# Patient Record
Sex: MALE | Race: OTHER | Employment: FULL TIME | URBAN - METROPOLITAN AREA
[De-identification: names, ages, dates, MRNs, and addresses within clinical notes are randomized per-mention and may not be internally consistent; named-entity substitution may affect disease eponyms.]

---

## 2019-06-17 ENCOUNTER — HOSPITAL ENCOUNTER (EMERGENCY)
Age: 34
Discharge: HOME | End: 2019-06-17
Attending: EMERGENCY MEDICINE
Payer: COMMERCIAL

## 2019-06-17 VITALS
RESPIRATION RATE: 18 BRPM | SYSTOLIC BLOOD PRESSURE: 103 MMHG | WEIGHT: 204 LBS | BODY MASS INDEX: 30.21 KG/M2 | DIASTOLIC BLOOD PRESSURE: 90 MMHG | TEMPERATURE: 98.3 F | HEIGHT: 69 IN | OXYGEN SATURATION: 96 % | HEART RATE: 74 BPM

## 2019-06-17 DIAGNOSIS — S61.411A LACERATION OF RIGHT HAND WITHOUT FOREIGN BODY, INITIAL ENCOUNTER: Primary | ICD-10-CM

## 2019-06-17 PROCEDURE — 75810000293 HC SIMP/SUPERF WND  RPR

## 2019-06-17 PROCEDURE — 90715 TDAP VACCINE 7 YRS/> IM: CPT | Performed by: PHYSICIAN ASSISTANT

## 2019-06-17 PROCEDURE — 77030018836 HC SOL IRR NACL ICUM -A

## 2019-06-17 PROCEDURE — 90471 IMMUNIZATION ADMIN: CPT

## 2019-06-17 PROCEDURE — 74011000250 HC RX REV CODE- 250: Performed by: PHYSICIAN ASSISTANT

## 2019-06-17 PROCEDURE — 99281 EMR DPT VST MAYX REQ PHY/QHP: CPT

## 2019-06-17 PROCEDURE — 74011250636 HC RX REV CODE- 250/636: Performed by: PHYSICIAN ASSISTANT

## 2019-06-17 PROCEDURE — 77030031132 HC SUT NYL COVD -A

## 2019-06-17 RX ORDER — TRAMADOL HYDROCHLORIDE 50 MG/1
50 TABLET ORAL
Qty: 10 TAB | Refills: 0 | Status: SHIPPED | OUTPATIENT
Start: 2019-06-17 | End: 2019-06-20

## 2019-06-17 RX ORDER — LIDOCAINE HYDROCHLORIDE AND EPINEPHRINE 10; 10 MG/ML; UG/ML
1.5 INJECTION, SOLUTION INFILTRATION; PERINEURAL
Status: COMPLETED | OUTPATIENT
Start: 2019-06-17 | End: 2019-06-17

## 2019-06-17 RX ORDER — CEPHALEXIN 500 MG/1
500 CAPSULE ORAL 3 TIMES DAILY
Qty: 21 CAP | Refills: 0 | Status: SHIPPED | OUTPATIENT
Start: 2019-06-17 | End: 2019-06-24

## 2019-06-17 RX ADMIN — TETANUS TOXOID, REDUCED DIPHTHERIA TOXOID AND ACELLULAR PERTUSSIS VACCINE, ADSORBED 0.5 ML: 5; 2.5; 8; 8; 2.5 SUSPENSION INTRAMUSCULAR at 20:45

## 2019-06-17 RX ADMIN — LIDOCAINE HYDROCHLORIDE,EPINEPHRINE BITARTRATE 15 MG: 10; .01 INJECTION, SOLUTION INFILTRATION; PERINEURAL at 20:07

## 2019-06-17 NOTE — ED TRIAGE NOTES
Triage: Patient was cutting an avocado with a kitchen knife and it slipped.  + laceration in between the right index and middle finger. Tetanus is not up to date.   Was sent here from [facility].

## 2019-06-18 NOTE — DISCHARGE INSTRUCTIONS
Patient Education        Garcia en la mano cerrados con puntos: Instrucciones de cuidado - [ Cuts on the Hand Closed With Stitches: Care Instructions ]  Instrucciones de cuidado    Un shahram en la mano puede ser en los dedos, el pulgar o la loya o el dorso de la [place]. A veces, un shahram puede lesionar los tendones, los vasos sanguíneos o los nervios de la [place]. El médico utilizó puntos para cerrar el shahram. Usar puntos también ayuda a que sane el shahram y reduce la formación de cicatrices. Es posible que el médico le haya dado kelle tablilla (férula) para ayudar a evitar que [employer] mano, los dedos o el pulgar. Si el shahram fue profundo y a través de la piel, el médico [employer] capas de puntos. En la capa más profunda, se juntan las partes profundas del shahram. Esos puntos se disolverán, y no es necesario quitarlos. Los puntos de la capa superior son los que ve en el shahram. Es probable que le pongan kelle venda. Será necesario que [facility] puntos, por lo general al cabo de 7 a 14 charissa. El médico puede sugerirle earl a un especialista de la mano si el shahram es muy profundo, si tiene problemas para  los dedos o si tiene menos sensibilidad en la mano. El médico lo christy revisado cuidadosamente, willian pueden aparecer [employer] tarde. Si observa algún problema o un síntoma nuevo, obtenga tratamiento médico de inmediato. La atención de seguimiento es kelle parte clave de soler tratamiento y seguridad. Asegúrese de hacer y acudir a todas las citas, y llame a soler médico si está teniendo problemas. También es kelle buena idea saber los resultados de deric exámenes y mantener kelle lista de los medicamentos que renee. ¿Cómo puede cuidarse en el hogar? · [address] primeras 24 a 48 horas. Después de esto, puede ducharse si soler médico lo aprueba. Seque el shahram con toques suaves de toalla. · No sumerja el shahram, ezra, por ejemplo, en kelle bañera. Soler médico le dirá cuándo es seguro mojar el shahram.   · Si soler médico le dijo cómo cuidarse el shahram, siga las instrucciones de au médico. Si no le jennifer instrucciones, siga estos consejos generales:  ? Después de las primeras 24 a 48 horas, lávese la neeraj alrededor del shahram con agua limpia 2 veces al día. No use peróxido de hidrógeno (agua [place]) ni alcohol, los cuales pueden retrasar la sanación. ? Puede cubrir el shahram con kelle capa delgada de vaselina y [facility] venda no adherente. ? Aplíquese más vaselina y [facility] la venda según sea necesario. · Mantenga elevada la mano adolorida sobre kelle almohada en cualquier momento que se siente o se acueste clarissa los 3 días siguientes. Trate de mantenerla por encima del nivel del corazón. [place] ayudará a reducir la hinchazón. · Evite cualquier actividad que podría hacer que el shahram se vuelva a abrir. · No se quite los puntos usted mismo. Au médico le dirá cuándo regresar para [employer]. · Sea primitivo con los medicamentos. [place] los analgésicos (medicamentos para el dolor) exactamente según lo indicado. ? Si el médico le recetó un analgésico, tómelo según las indicaciones. ? Si no está tomando un analgésico recetado, pregúntele a au médico si puede dale juan f de [employer]. ¿Cuándo debe pedir ayuda? Llame a au médico ahora mismo o busque atención médica inmediata si:    · Siente un dolor nuevo o el dolor empeora.     · La piel cercana al shahram está fría o pálida, o cambia de color.     · Siente hormigueo, debilitamiento o entumecimiento cerca del shahram.     · El shahram comienza a sangrar, y la clark empapa la venda. Es normal que salgan pequeñas cantidades de [place].     · Tiene dificultades para  la neeraj de la mano cercana al shahram.     · Tiene síntomas de infección, tales ezra:  ? Aumento del dolor, la hinchazón, el enrojecimiento o la temperatura alrededor del shahram. ? Vetas rojizas que salen del shahram. ? Pus que sale del shahram.   ? Vincent Lennox especial atención a los cambios en au maria isabel y asegúrese de comunicarse con au médico si:    · No mejora ezra se esperaba. ¿Dónde puede encontrar más información en inglés? Eliu Arshad a http://carol-yuri.info/. Escriba T250 en la búsqueda para aprender más acerca de \"Garcia en la mano cerrados con puntos: Instrucciones de cuidado - [ Cuts on the Hand Closed With Stitches: Care Instructions ]. \"  Revisado: 23 septiembre, 2018  Versión del contenido: 11.9  © 7058-5600 Healthwise, Incorporated. Las instrucciones de cuidado fueron adaptadas bajo licencia por [employer] (which disclaims liability or warranty for this information). Si usted tiene [place] afección médica o sobre estas instrucciones, siempre pregunte a au profesional de maria isabel. [employer] niega toda garantía o responsabilidad por au uso de esta información.

## 2019-06-18 NOTE — ED PROVIDER NOTES
35 y.o. male with no significant past medical history presents ambulatory and accompanied by relative with chief complaint of a laceration between his right 2nd and 3rd fingers that occurred just PTA. Pt's family explains that the pt was cutting an avocado when he accidentally cut himself. Per relative, the pt's tetanus vaccination is not UTD. Pt denies any fevers, chills, nausea, vomiting, or any other symptoms at this time. There are no other acute medical concerns at this time. Social hx: Patient denies Tobacco use. Denies EtOH use. Denies illicit drug abuse. PCP: No primary care provider on file. Note written by Jeovanny Olivares, as dictated by Td Horowitz PA-C, 8:07 PM          The history is provided by the patient and a relative. No  was used. History reviewed. No pertinent past medical history. History reviewed. No pertinent surgical history. History reviewed. No pertinent family history.     Social History     Socioeconomic History    Marital status: SINGLE     Spouse name: Not on file    Number of children: Not on file    Years of education: Not on file    Highest education level: Not on file   Occupational History    Not on file   Social Needs    Financial resource strain: Not on file    Food insecurity:     Worry: Not on file     Inability: Not on file    Transportation needs:     Medical: Not on file     Non-medical: Not on file   Tobacco Use    Smoking status: Never Smoker    Smokeless tobacco: Never Used   Substance and Sexual Activity    Alcohol use: Never     Frequency: Never    Drug use: Never    Sexual activity: Not on file   Lifestyle    Physical activity:     Days per week: Not on file     Minutes per session: Not on file    Stress: Not on file   Relationships    Social connections:     Talks on phone: Not on file     Gets together: Not on file     Attends Sabianist service: Not on file     Active member of club or organization: Not on file     Attends meetings of clubs or organizations: Not on file     Relationship status: Not on file    Intimate partner violence:     Fear of current or ex partner: Not on file     Emotionally abused: Not on file     Physically abused: Not on file     Forced sexual activity: Not on file   Other Topics Concern    Not on file   Social History Narrative    Not on file         ALLERGIES: Patient has no known allergies. Review of Systems   Constitutional: Negative for chills and fever. Gastrointestinal: Negative for nausea and vomiting. Musculoskeletal: Negative for arthralgias. Skin: Positive for wound (laceration between right 2nd and 3rd fingers). Neurological: Negative for numbness. All other systems reviewed and are negative. Vitals:    06/17/19 1951   BP: 103/90   Pulse: 74   Resp: 18   Temp: 98.3 °F (36.8 °C)   SpO2: 96%   Weight: 92.5 kg (204 lb)   Height: 5' 9\" (1.753 m)            Physical Exam   Constitutional: He is oriented to person, place, and time. He appears well-developed and well-nourished. HENT:   Head: Normocephalic and atraumatic. Right Ear: External ear normal.   Left Ear: External ear normal.   Mouth/Throat: Oropharynx is clear and moist. No oropharyngeal exudate. Eyes: Pupils are equal, round, and reactive to light. Conjunctivae and EOM are normal. Right eye exhibits no discharge. Left eye exhibits no discharge. No scleral icterus. Neck: Normal range of motion. Neck supple. No tracheal deviation present. No thyromegaly present. Cardiovascular: Normal rate, regular rhythm, normal heart sounds and intact distal pulses. No murmur heard. Pulmonary/Chest: Effort normal and breath sounds normal. No respiratory distress. He has no wheezes. He has no rales. Abdominal: Soft. Bowel sounds are normal. He exhibits no distension. There is no tenderness. There is no rebound and no guarding. Musculoskeletal: Normal range of motion. He exhibits no edema or tenderness. Hands:  Lymphadenopathy:     He has no cervical adenopathy. Neurological: He is alert and oriented to person, place, and time. No cranial nerve deficit. Coordination normal.   Skin: Skin is warm. No rash noted. No erythema. Psychiatric: He has a normal mood and affect. His behavior is normal. Judgment and thought content normal.   Nursing note and vitals reviewed. MDM  Number of Diagnoses or Management Options  Laceration of right hand without foreign body, initial encounter:   Diagnosis management comments: Assesment/Plan- 35 y.o. Patient presents with:  Laceration  differential includes: laceration, . Wound is well approximated with sutures. Patient tolerated well. Recommend PCP, ortho follow up. Patient educated on reasons to return to the ED. Wound Repair  Date/Time: 6/17/2019 8:18 PM  [facility]: Edna  Preparation: skin prepped with Shur-Clens  Pre-procedure re-eval: Immediately prior to the procedure, the patient was reevaluated and found suitable for the planned procedure and any planned medications. Time out: Immediately prior to the procedure a time out was called to verify the correct patient, procedure, equipment, staff and marking as appropriate. .  Location details: right hand  Wound length:2.5 cm or less  Anesthesia: local infiltration    Anesthesia:  Local Anesthetic: lidocaine 1% with epinephrine  Anesthetic total: 5 mL  Foreign bodies: no foreign bodies  Irrigation solution: saline  Irrigation method: syringe  Debridement: none  Fascia closure: 4-0 nylon  Number of sutures: 7  Technique: simple  Approximation: close  Patient tolerance: Patient tolerated the procedure well with no immediate complications  My total time at bedside, performing this procedure was 16-30 minutes.

## 2019-06-24 ENCOUNTER — HOSPITAL ENCOUNTER (EMERGENCY)
Age: 34
Discharge: HOME | End: 2019-06-24
Attending: EMERGENCY MEDICINE
Payer: COMMERCIAL

## 2019-06-24 VITALS
BODY MASS INDEX: 30.21 KG/M2 | OXYGEN SATURATION: 98 % | RESPIRATION RATE: 16 BRPM | WEIGHT: 204 LBS | DIASTOLIC BLOOD PRESSURE: 57 MMHG | HEIGHT: 69 IN | SYSTOLIC BLOOD PRESSURE: 101 MMHG | HEART RATE: 59 BPM | TEMPERATURE: 98.7 F

## 2019-06-24 DIAGNOSIS — Z48.02 VISIT FOR SUTURE REMOVAL: Primary | ICD-10-CM

## 2019-06-24 PROCEDURE — 75810000275 HC EMERGENCY DEPT VISIT NO LEVEL OF CARE

## 2019-06-24 NOTE — ED PROVIDER NOTES
35 y.o. male with no significant past medical history who presents from home with chief complaint of suture removal. Patient has ~6 sutures placed to his right third finger a week ago, and presents to [facility] ED today for removal. Patient endorses \"itchiness\" and mild swelling, but denies any current pain. Patient is right hand dominant. Patient's last tetanus 06/17/2019. Pt denies fever, chills, cough, congestion, shortness of breath, chest pain, abdominal pain, nausea, vomiting, diarrhea, difficulty with urination or dysuria. There are no other acute medical concerns at this time. Note written by Jeovanny Guillen, as dictated by Farhat Arnold MD 12:05 PM        The history is provided by the patient. No past medical history on file. No past surgical history on file. No family history on file.     Social History     Socioeconomic History    Marital status: SINGLE     Spouse name: Not on file    Number of children: Not on file    Years of education: Not on file    Highest education level: Not on file   Occupational History    Not on file   Social Needs    Financial resource strain: Not on file    Food insecurity:     Worry: Not on file     Inability: Not on file    Transportation needs:     Medical: Not on file     Non-medical: Not on file   Tobacco Use    Smoking status: Never Smoker    Smokeless tobacco: Never Used   Substance and Sexual Activity    Alcohol use: Never     Frequency: Never    Drug use: Never    Sexual activity: Not on file   Lifestyle    Physical activity:     Days per week: Not on file     Minutes per session: Not on file    Stress: Not on file   Relationships    Social connections:     Talks on phone: Not on file     Gets together: Not on file     Attends Rastafari service: Not on file     Active member of club or organization: Not on file     Attends meetings of clubs or organizations: Not on file     Relationship status: Not on file    Intimate partner violence:     Fear of current or ex partner: Not on file     Emotionally abused: Not on file     Physically abused: Not on file     Forced sexual activity: Not on file   Other Topics Concern    Not on file   Social History Narrative    Not on file         ALLERGIES: Patient has no known allergies. Review of Systems   Constitutional: Negative for activity change and fever. Eyes: Negative for pain. Respiratory: Negative for cough and shortness of breath. Cardiovascular: Negative for chest pain and leg swelling. Gastrointestinal: Negative for abdominal pain, diarrhea, nausea and vomiting. Genitourinary: Negative for difficulty urinating, dysuria, flank pain and hematuria. Musculoskeletal: Negative for gait problem, neck pain and neck stiffness. Skin: Negative for color change and wound. Neurological: Negative for speech difficulty and headaches. Hematological: Does not bruise/bleed easily. Psychiatric/Behavioral: Negative for confusion. All other systems reviewed and are negative. There were no vitals filed for this visit. Physical Exam   Constitutional: He is oriented to person, place, and time. He appears well-developed and well-nourished. No distress. HENT:   Head: Normocephalic and atraumatic. Right Ear: External ear normal.   Left Ear: External ear normal.   Eyes: Pupils are equal, round, and reactive to light. EOM are normal.   Neck: Normal range of motion. Neck supple. No JVD present. No tracheal deviation present. Cardiovascular: Normal rate, regular rhythm and normal heart sounds. Exam reveals no gallop and no friction rub. No murmur heard. Pulmonary/Chest: Effort normal and breath sounds normal. No stridor. No respiratory distress. He has no wheezes. He has no rales. Abdominal: Soft. Bowel sounds are normal. He exhibits no distension. There is no tenderness. There is no rebound and no guarding. Musculoskeletal: He exhibits no edema or tenderness. Good ROM of 3rd right finger. Neurological: He is alert and oriented to person, place, and time. He has normal reflexes. No cranial nerve deficit. Coordination normal.   Skin: Skin is warm and dry. No rash noted. He is not diaphoretic. Radial side of right 3rd finger 6 sutures intact, no redness, warmth, or drainage. Psychiatric: He has a normal mood and affect. His behavior is normal. Judgment and thought content normal.   Nursing note and vitals reviewed. Note written by Jeovanny Kamara, as dictated by Anna Castillo MD 12:09 PM       MDM  Number of Diagnoses or Management Options  Visit for suture removal:   Diagnosis management comments: Pt w/ sutures to be removed. I removed sutures. Suture/Staple Removal  Date/Time: 6/24/2019 12:09 PM  Performed by: Anna Castillo MD  Authorized by: Anna Castillo MD     Consent:     Consent obtained:  Verbal    Risks discussed:  Bleeding and pain  Location:     Location: 3rd right finger. Procedure details:     Wound appearance:  No signs of infection, good wound healing and clean    Number of sutures removed:  6  Post-procedure details:     Patient tolerance of procedure: Tolerated well, no immediate complications    Note written by Jeovanny Kamara, as dictated by Anna Castillo MD 12:05 PM    PROGRESS NOTE:  12:09 PM Provider removing sutures in triage. Good return precautions given to patient. Close follow up with PCP recommended. Patient and/or family voices understanding of this plan. Discharge instructions were explained by me and all concerns were addressed.

## 2019-06-24 NOTE — DISCHARGE INSTRUCTIONS
Patient Education        Blu Litten de la retirada de puntos de sutura y grapas - [ Cathern Adjutant and [place] Removal ]  ¿Cuándo se retiran los puntos de sutura y las grapas? Au médico le indicará cuándo deben [employer] puntos de sutura o las grapas, por lo general al cabo de 7 a 14 charissa. El tiempo que tenga que esperar dependerá de cosas ezra la ubicación de la herida, el tamaño y la profundidad de la herida y au estado de maria isabel general. No se quite los puntos de sutura usted mismo. Los puntos en la [place] se suelen retirar al cabo de kelle semana. [place] los puntos y las grapas en otras zonas del cuerpo, ezra en la espalda o el abdomen o sobre kelle articulación, podrían tener que permanecer en au lugar por más [place], frecuentemente kelle o [place]. Asegúrese de seguir las instrucciones de au médico.  ¿Cómo se retiran los puntos de sutura y las grapas? Generalmente no duele cuando el médico retira los puntos de sutura o las grapas. Usted puede sentir un pequeño tirón cuando se saca cada punto o grapa. · Estará sentado o recostado. · Para retirar los puntos de sutura, el médico utilizará tijeras para cortar cada juan f de los nudos y [employer] [facility] hilos. · Para retirar las grapas, el médico usará un instrumento para sacar las grapas kelle por [place]. · La neeraj puede estar sensible después de kori retirado los puntos de sutura o las grapas. [place] debería sentirse mejor al cabo de unos minutos o después de algunas horas. ¿Qué puede esperar después de que le retiren los puntos de sutura o las grapas? Dependiendo del tipo de shahram y de au ubicación, usted tendrá kelle cicatriz. Las cicatrices por lo general son menos visibles con el tiempo. Mantenga limpia la neeraj, [place] no se necesita kelle venda. ¿Cuándo debe pedir ayuda? Llame a au médico ahora mismo o busque atención médica inmediata si:  · Siente un dolor nuevo o el dolor empeora.   · Tiene dificultades para  la neeraj que rodea la cicatriz. · Tiene síntomas de infección, tales ezra:  ? Mayor dolor, hinchazón, enrojecimiento o aumento de la temperatura alrededor de la cicatriz. ? Vetas rojizas que salen de la cicatriz. ? Pus que sale de la cicatriz. ? Fiebre. Preste especial atención a los cambios en au maria isabel y asegúrese de comunicarse con au médico si:  · Se le abre la cicatriz. · No mejora ezra se esperaba. La atención de seguimiento es kelle parte clave de au tratamiento y seguridad. Asegúrese de hacer y acudir a todas las citas, y llame a au médico si está teniendo problemas. También es kelle buena idea mantener kelle lista de los medicamentos que renee. ¿Dónde puede encontrar más información en inglés? Jared Burnett a http://carol-yuri.info/. Tonya Nanette R485 en la búsqueda para aprender más acerca de \"Aprenda acerca de la retirada de puntos de sutura y grapas - [ Learning About Stitches and Staples Removal ]. \"  Revisado: 23 septiembre, 2018  Versión del contenido: 11.9  © 5294-3837 Healthwise, Incorporated. Las instrucciones de cuidado fueron adaptadas bajo licencia por [employer] (which disclaims liability or warranty for this information). Si usted tiene [place] afección médica o sobre estas instrucciones, siempre pregunte a au profesional de maria isabel. Healthwise, Incorporated niega toda garantía o responsabilidad por au uso de esta información.

## 2019-06-24 NOTE — ED TRIAGE NOTES
Pt arrives with 6 sutures to 3rd finger placed approximately 1 week ago. Site is clean, well approximated, with no drainage or redness noted. Finger is swollen.